# Patient Record
Sex: FEMALE | Race: AMERICAN INDIAN OR ALASKA NATIVE
[De-identification: names, ages, dates, MRNs, and addresses within clinical notes are randomized per-mention and may not be internally consistent; named-entity substitution may affect disease eponyms.]

---

## 2020-05-11 ENCOUNTER — HOSPITAL ENCOUNTER (EMERGENCY)
Dept: HOSPITAL 5 - ED | Age: 45
Discharge: HOME | End: 2020-05-11
Payer: SELF-PAY

## 2020-05-11 DIAGNOSIS — F17.200: ICD-10-CM

## 2020-05-11 DIAGNOSIS — R07.89: ICD-10-CM

## 2020-05-11 DIAGNOSIS — Z98.890: ICD-10-CM

## 2020-05-11 DIAGNOSIS — Z88.8: ICD-10-CM

## 2020-05-11 DIAGNOSIS — R06.02: Primary | ICD-10-CM

## 2020-05-11 DIAGNOSIS — F41.9: ICD-10-CM

## 2020-05-11 LAB
BASOPHILS # (AUTO): 0 K/MM3 (ref 0–0.1)
BASOPHILS NFR BLD AUTO: 0.5 % (ref 0–1.8)
BUN SERPL-MCNC: 10 MG/DL (ref 7–17)
BUN/CREAT SERPL: 13 %
CALCIUM SERPL-MCNC: 8.8 MG/DL (ref 8.4–10.2)
EOSINOPHIL # BLD AUTO: 0.1 K/MM3 (ref 0–0.4)
EOSINOPHIL NFR BLD AUTO: 1.5 % (ref 0–4.3)
HCT VFR BLD CALC: 37.3 % (ref 30.3–42.9)
HEMOLYSIS INDEX: 101
HGB BLD-MCNC: 12.4 GM/DL (ref 10.1–14.3)
LYMPHOCYTES # BLD AUTO: 2.3 K/MM3 (ref 1.2–5.4)
LYMPHOCYTES NFR BLD AUTO: 24.4 % (ref 13.4–35)
MCHC RBC AUTO-ENTMCNC: 33 % (ref 30–34)
MCV RBC AUTO: 93 FL (ref 79–97)
MONOCYTES # (AUTO): 0.7 K/MM3 (ref 0–0.8)
MONOCYTES % (AUTO): 7.1 % (ref 0–7.3)
PLATELET # BLD: 255 K/MM3 (ref 140–440)
RBC # BLD AUTO: 4.03 M/MM3 (ref 3.65–5.03)

## 2020-05-11 PROCEDURE — 93005 ELECTROCARDIOGRAM TRACING: CPT

## 2020-05-11 PROCEDURE — 36415 COLL VENOUS BLD VENIPUNCTURE: CPT

## 2020-05-11 PROCEDURE — 80048 BASIC METABOLIC PNL TOTAL CA: CPT

## 2020-05-11 PROCEDURE — 85025 COMPLETE CBC W/AUTO DIFF WBC: CPT

## 2020-05-11 PROCEDURE — 84703 CHORIONIC GONADOTROPIN ASSAY: CPT

## 2020-05-11 PROCEDURE — 84484 ASSAY OF TROPONIN QUANT: CPT

## 2020-05-11 PROCEDURE — 71045 X-RAY EXAM CHEST 1 VIEW: CPT

## 2020-05-11 NOTE — XRAY REPORT
CHEST 1 VIEW 



INDICATION: 

Chest Pain.



COMPARISON: 

None



FINDINGS:

Support devices: None.



Heart: Within normal limits. 

Lungs/Pleura: No acute air space or interstitial disease. 



Additional findings: None.



IMPRESSION:

1. No acute findings.



Signer Name: Олег Coleman MD 

Signed: 5/11/2020 6:45 AM

Workstation Name: [id number]

## 2020-05-11 NOTE — EMERGENCY DEPARTMENT REPORT
ED Shortness of Breath HPI





- General


Chief Complaint: Dyspnea/Respdistress


Stated Complaint: DIFFICULTY IN BREATHING


Time Seen by Provider: 05/11/20 06:13


Source: patient


Mode of arrival: Stretcher


Limitations: No Limitations





- History of Present Illness


Initial Comments: 





44-year-old female, history of anxiety, presents to ED with shortness of breath.

 Patient states she was awakened from sleep with shortness of breath.  Reports 

associated throbbing chest pain, heart racing.  Patient denies any fever, cough,

or exposure to anyone that has tested positive for COVID-19.  Patient arrived 

via ambulance approximately 1 hour prior to my arrival here in the ED.  RN 

reports patient has been sleeping comfortably.  Patient reports she feels much 

better at this time.  She denies any current dyspnea or chest pain.  Patient 

reports this felt similar to previous anxiety attacks.  Patient states she has 

been under lots of stress due to current COVID pandemic.


MD Complaint: shortness of breath, chest pain


-: hour(s) (2)


Severity: moderate


Quality: throbbing


Consistency: now resolved


Improves With: nothing


Worsens With: nothing


Known History Of: other (anxiety)


Associated Symptoms: chest pain


Treatments Prior to Arrival: none





- Related Data


Home Oxygen Therapy: No


                                    Allergies











Allergy/AdvReac Type Severity Reaction Status Date / Time


 


doxycycline AdvReac  Dizziness Verified 05/11/20 04:46














ED Review of Systems


ROS: 


Stated complaint: DIFFICULTY IN BREATHING


Other details as noted in HPI





Comment: All other systems reviewed and negative


Constitutional: denies: chills, fever


Respiratory: shortness of breath.  denies: cough


Cardiovascular: chest pain, palpitations


Gastrointestinal: denies: nausea, vomiting


Musculoskeletal: other (Denies leg pain or swelling)





ED Past Medical Hx





- Past Medical History


Previous Medical History?: Yes


Additional medical history: Anxiety





- Surgical History


Past Surgical History?: Yes


Additional Surgical History: bilateral sweat gland removal





- Social History


Smoking Status: Current Every Day Smoker





ED Physical Exam





- General


Limitations: No Limitations


General appearance: alert, in no apparent distress





- Head


Head exam: Present: atraumatic, normocephalic





- Eye


Eye exam: Present: normal appearance, EOMI





- ENT


ENT exam: Present: mucous membranes moist





- Neck


Neck exam: Present: normal inspection





- Respiratory


Respiratory exam: Present: normal lung sounds bilaterally.  Absent: respiratory 

distress





- Cardiovascular


Cardiovascular Exam: Present: regular rate, normal rhythm





- GI/Abdominal


GI/Abdominal exam: Present: soft.  Absent: distended, tenderness





- Extremities Exam


Extremities exam: Present: normal inspection





- Neurological Exam


Neurological exam: Present: alert, oriented X3, CN II-XII intact.  Absent: motor

sensory deficit





- Psychiatric


Psychiatric exam: Present: normal affect, normal mood





- Skin


Skin exam: Present: warm, dry, intact, normal color





ED Course


                                   Vital Signs











  05/11/20 05/11/20 05/11/20





  04:34 04:36 04:46


 


Temperature  97.4 F L 


 


Pulse Rate 74 77 65


 


Respiratory 30 H 36 H 27 H





Rate   


 


Blood Pressure  113/68 113/68


 


Blood Pressure  113/68 





[Right]   


 


O2 Sat by Pulse 100 100 98





Oximetry   














  05/11/20 05/11/20 05/11/20





  05:00 06:00 06:15


 


Temperature   


 


Pulse Rate 70 95 H 88


 


Respiratory 17 24 19





Rate   


 


Blood Pressure 112/57 112/57 


 


Blood Pressure   118/77





[Right]   


 


O2 Sat by Pulse 97 100 99





Oximetry   














  05/11/20





  07:00


 


Temperature 


 


Pulse Rate 79


 


Respiratory 18





Rate 


 


Blood Pressure 118/77


 


Blood Pressure 





[Right] 


 


O2 Sat by Pulse 98





Oximetry 














ED Medical Decision Making





- Lab Data


Result diagrams: 


                                 05/11/20 05:05





                                 05/11/20 05:05





- EKG Data


-: EKG Interpreted by Me


EKG shows normal: sinus rhythm, axis, intervals, QRS complexes, ST-T waves


Rate: normal





- EKG Data


Interpretation: no acute changes





- Radiology Data


Radiology results: report reviewed, image reviewed





- Medical Decision Making





- likely anxiety


- all symptoms currently resolved


- EKG and troponin normal


- vitals stable; pt likely initially hyperventilating, as RR has improved after 

settling down and taking a nap


- pt comfortable w/ d/c home


- outpt f/u advised; return precautions given





- Differential Diagnosis


anxiety, ACS, pneumonia


Critical care attestation.: 


If time is entered above; I have spent that time in minutes in the direct care 

of this critically ill patient, excluding procedure time.








ED Disposition


Clinical Impression: 


 Shortness of breath





Disposition: DC-01 TO HOME OR SELFCARE


Is pt being admited?: No


Condition: Stable


Instructions:  Dyspnea (ED), Anxiety (ED)


Referrals: 


PRIMARY CARE,MD [Primary Care Provider] - 3-5 Days


[facility] [Provider Group] - 3-5 Days


Time of Disposition: 07:09

## 2020-05-12 VITALS — DIASTOLIC BLOOD PRESSURE: 77 MMHG | SYSTOLIC BLOOD PRESSURE: 118 MMHG

## 2022-02-01 NOTE — EMERGENCY DEPARTMENT REPORT
ED General Adult HPI





- General


Chief complaint: Skin/Abscess/Foreign Body


Stated complaint: ABCESS ON BACK


Source: patient


Mode of arrival: Ambulatory


Limitations: No Limitations





- History of Present Illness


Initial comments: 





Patient is a 46-year-old -American female with a history of hidradenitis 

suppurativa and anxiety who presents to the ED with complaint of acute onset 

persistent painful swollen maculopapular rash on left lateral mid posterior 

thoracic area for the last 1 week, worse in the last 2 days.  Patient states 

that she has not been able to sleep because of worsening pain and that the pain 

is also worse with any active range of motion of her upper extremities.  Patient

denies chest pain, shortness of breath, nausea and vomiting, dizziness, syncope,

fever, chills, traumatic injury, heavy lifting, neck pain, numbness and tingling

or weakness of upper and lower extremities bilaterally.


MD Complaint: Painful swollen fluctuant rash on mid posterior thoracic area


-: Sudden, week(s) (1)


Location: back (Mid posterior thoracic area)


Severity scale (0 -10): 6


Quality: aching, sharp


Consistency: constant


Improves with: none


Worsens with: none


Associated Symptoms: denies other symptoms, rash (Swollen, painful mild 

erythematous maculopapular fluctuant rash on mid posterior thoracic area).  

denies: confusion, chest pain, cough, diaphoresis, fever/chills, headaches, 

malaise, nausea/vomiting, seizure, shortness of breath, other


Treatments Prior to Arrival: none





- Related Data


                                  Previous Rx's











 Medication  Instructions  Recorded  Last Taken  Type


 


Clindamycin [Clindamycin CAP] 300 mg PO Q8H #30 cap 02/01/22 Unknown Rx


 


Fluconazole [Diflucan TAB] 200 mg PO QDAY #1 tablet 02/01/22 Unknown Rx


 


Ibuprofen [Motrin] 800 mg PO Q8HR PRN #30 tablet 02/01/22 Unknown Rx


 


Ondansetron [Zofran Odt] 4 mg PO Q8HR PRN #15 tab.rapdis 02/01/22 Unknown Rx


 


rifAMPin [Rifadin] 300 mg PO Q12H #60 cap 02/01/22 Unknown Rx


 


traMADoL [Ultram] 50 mg PO Q6HR PRN #12 tablet 02/01/22 Unknown Rx











                                    Allergies











Allergy/AdvReac Type Severity Reaction Status Date / Time


 


doxycycline AdvReac  Dizziness Verified 05/11/20 04:46














ED Review of Systems


ROS: 


Stated complaint: ABCESS ON BACK


Other details as noted in HPI





Constitutional: denies: chills, fever


Eyes: denies: eye pain, eye discharge, vision change


ENT: denies: ear pain, throat pain


Respiratory: denies: cough, shortness of breath, wheezing


Cardiovascular: denies: chest pain, palpitations


Endocrine: no symptoms reported


Gastrointestinal: denies: abdominal pain, nausea, diarrhea


Genitourinary: denies: urgency, dysuria, discharge


Musculoskeletal: denies: back pain, joint swelling, arthralgia


Skin: rash (Swollen, painful, mild erythematous maculopapular rash on mid 

posterior thoracic area).  denies: lesions


Neurological: denies: headache, weakness, paresthesias


Psychiatric: denies: anxiety, depression


Hematological/Lymphatic: denies: easy bleeding, easy bruising





ED Past Medical Hx





- Past Medical History


Previous Medical History?: Yes


Additional medical history: Anxiety, skin disorder (Hidradenitis suppurativa)





- Surgical History


Past Surgical History?: Yes


Additional Surgical History: bilateral sweat gland removal





- Social History


Smoking Status: Current Every Day Smoker





- Medications


Home Medications: 


                                Home Medications











 Medication  Instructions  Recorded  Confirmed  Last Taken  Type


 


Clindamycin [Clindamycin CAP] 300 mg PO Q8H #30 cap 02/01/22  Unknown Rx


 


Fluconazole [Diflucan TAB] 200 mg PO QDAY #1 tablet 02/01/22  Unknown Rx


 


Ibuprofen [Motrin] 800 mg PO Q8HR PRN #30 tablet 02/01/22  Unknown Rx


 


Ondansetron [Zofran Odt] 4 mg PO Q8HR PRN #15 tab.rapdis 02/01/22  Unknown Rx


 


rifAMPin [Rifadin] 300 mg PO Q12H #60 cap 02/01/22  Unknown Rx


 


traMADoL [Ultram] 50 mg PO Q6HR PRN #12 tablet 02/01/22  Unknown Rx














ED Physical Exam





- General


Limitations: No Limitations


General appearance: alert, in no apparent distress





- Head


Head exam: Present: atraumatic, normocephalic, normal inspection





- Eye


Eye exam: Present: normal appearance, PERRL, EOMI





- ENT


ENT exam: Present: normal exam, normal orophraynx, mucous membranes moist, TM's 

normal bilaterally, normal external ear exam





- Neck


Neck exam: Present: normal inspection, full ROM.  Absent: tenderness





- Respiratory


Respiratory exam: Present: normal lung sounds bilaterally.  Absent: respiratory 

distress, wheezes, rales, rhonchi, chest wall tenderness, accessory muscle use, 

decreased breath sounds, prolonged expiratory





- Cardiovascular


Cardiovascular Exam: Present: regular rate, normal rhythm, normal heart sounds. 

 Absent: systolic murmur, diastolic murmur, rubs, gallop





- GI/Abdominal


GI/Abdominal exam: Present: soft, normal bowel sounds.  Absent: tenderness, 

guarding, rebound, hyperactive bowel sounds, hypoactive bowel sounds, 

organomegaly





- Extremities Exam


Extremities exam: Present: normal inspection, full ROM, normal capillary refill





- Back Exam


Back exam: Present: normal inspection, full ROM, tenderness (Palpable localized 

mid posterior lateral paraspinal musculoskeletal thoracic tenderness due to mild

 erythematous maculopapular fluctuant rash ), rash noted (Swollen, mild 

erythematous fluctuant, tender maculopapular fluctuant rash on left lateral mid 

posterior thoracic paraspinal musculoskeletal area).  Absent: CVA tenderness 

(L), muscle spasm, paraspinal tenderness, vertebral tenderness





- Neurological Exam


Neurological exam: Present: alert, oriented X3, CN II-XII intact, normal gait, 

reflexes normal





- Psychiatric


Psychiatric exam: Present: normal affect, normal mood





- Skin


Skin exam: Present: warm, dry, intact, normal color, rash (Swollen, mild 

erythematous maculopapular fluctuant rash on left lateral mid posterior thoracic

 paraspinal musculoskeletal area), erythema.  Absent: urticaria, vesicles, 

pallor, abrasion





ED Course





                                   Vital Signs











  02/01/22





  20:46


 


Temperature 98.0 F


 


Pulse Rate 78


 


Respiratory 17





Rate 


 


Blood Pressure 136/70





[Right] 


 


O2 Sat by Pulse 98





Oximetry 














- I & D


  ** Left Lateral Back


Type of Procedure: Simple


Site: Left lateral mid posterior thoracic paraspinal area


Blade Size: 11


I & D Procedure: betadine prep


Progress: 





There was cleaned with normal saline and Betadine solutions.  1% lidocaine 

solution was used for local anesthesia.  When anesthesia was fully achieved, a 

size 11 scalpel blade was used to incise and copious thick purulent discharge 

drained from the wound.  Fluctuance were broken with hemostat and the wound 

debrided extensively with normal saline.  The wound was then packed with 

iodoform gauze and dressed appropriately with 4 x 4 gauzes and Tegaderm.  

Patient tolerated procedure well.





ED Medical Decision Making





- Medical Decision Making





This is a 46-year-old -American female with a history of hidradenitis 

suppurativa and anxiety who presents to the ED with complaint of acute onset p

ersistent painful swollen maculopapular rash on left lateral mid posterior 

thoracic area for the last 1 week, worse in the last 2 days.  Patient states 

that she has not been able to sleep because of worsening pain and that the pain 

is also worse with any active range of motion of her upper extremities.  In the 

ED, patient is alert and oriented x3 and is not in any distress.  Patient was 

treated for pain in the ED and also given initial oral antibiotics.  The area 

was cleaned and incised and drained per protocol.  Patient tolerated the 

procedure well.  The wound was then cleaned and dressed appropriately.  Patient 

was discharged home on antibiotics and pain medications and advised to follow-up

 with her primary care physician in 7 to 10 days for reevaluation or return to 

the ED immediately if symptoms get worse.





- Differential Diagnosis


Cellulitis; folliculitis; hydradenitis; skin abscess


Critical care attestation.: 


If time is entered above; I have spent that time in minutes in the direct care 

of this critically ill patient, excluding procedure time.








ED Disposition


Clinical Impression: 


 Cellulitis of mid back region, Cutaneous abscess of back excluding buttocks, 

Acute folliculitis, Hidradenitis suppurativa





Disposition: 01 HOME / SELF CARE / HOMELESS


Is pt being admited?: No


Does the pt Need Aspirin: No


Condition: Stable


Instructions:  Cellulitis, Adult, Easy-to-Read, Hidradenitis Suppurativa, Skin 

Abscess, Easy-to-Read


Additional Instructions: 


Take medication with food, drink plenty of fluids and follow-up with your 

primary care physician in 7 to 10 days for reevaluation.  Return to the ED 

immediately if symptoms get worse.


Prescriptions: 


Clindamycin [Clindamycin CAP] 300 mg PO Q8H #30 cap


Fluconazole [Diflucan TAB] 200 mg PO QDAY #1 tablet


Ibuprofen [Motrin] 800 mg PO Q8HR PRN #30 tablet


 PRN Reason: Pain , Severe (7-10)


rifAMPin [Rifadin] 300 mg PO Q12H #60 cap


traMADoL [Ultram] 50 mg PO Q6HR PRN #12 tablet


 PRN Reason: Pain


Ondansetron [Zofran Odt] 4 mg PO Q8HR PRN #15 tab.rapdis


 PRN Reason: Nausea


Referrals: 


[facility] [Provider Group] - 7-10 days


Time of Disposition: 22:29


Print Language: ENGLISH